# Patient Record
Sex: FEMALE | Race: BLACK OR AFRICAN AMERICAN | NOT HISPANIC OR LATINO | Employment: UNEMPLOYED | URBAN - METROPOLITAN AREA
[De-identification: names, ages, dates, MRNs, and addresses within clinical notes are randomized per-mention and may not be internally consistent; named-entity substitution may affect disease eponyms.]

---

## 2021-03-18 PROBLEM — I82.409 DVT (DEEP VENOUS THROMBOSIS): Status: ACTIVE | Noted: 2021-02-14

## 2021-03-18 PROBLEM — I10 ESSENTIAL HYPERTENSION: Status: ACTIVE | Noted: 2017-09-05

## 2021-03-18 PROBLEM — D64.9 ANEMIA: Status: ACTIVE | Noted: 2021-02-19

## 2024-10-21 DIAGNOSIS — N28.9 RENAL INSUFFICIENCY: Primary | ICD-10-CM

## 2024-12-13 ENCOUNTER — LAB VISIT (OUTPATIENT)
Dept: LAB | Facility: HOSPITAL | Age: 59
End: 2024-12-13
Attending: INTERNAL MEDICINE
Payer: COMMERCIAL

## 2024-12-13 DIAGNOSIS — N28.9 RENAL INSUFFICIENCY: ICD-10-CM

## 2024-12-13 LAB
ANION GAP SERPL CALC-SCNC: 11 MMOL/L (ref 8–16)
BASOPHILS # BLD AUTO: 0.06 K/UL (ref 0–0.2)
BASOPHILS NFR BLD: 0.8 % (ref 0–1.9)
BUN SERPL-MCNC: 16 MG/DL (ref 8–23)
CALCIUM SERPL-MCNC: 9.5 MG/DL (ref 8.7–10.5)
CHLORIDE SERPL-SCNC: 105 MMOL/L (ref 95–110)
CO2 SERPL-SCNC: 25 MMOL/L (ref 23–29)
CREAT SERPL-MCNC: 1.2 MG/DL (ref 0.5–1.4)
DIFFERENTIAL METHOD BLD: ABNORMAL
EOSINOPHIL # BLD AUTO: 0.1 K/UL (ref 0–0.5)
EOSINOPHIL NFR BLD: 0.9 % (ref 0–8)
ERYTHROCYTE [DISTWIDTH] IN BLOOD BY AUTOMATED COUNT: 16.3 % (ref 11.5–14.5)
EST. GFR  (NO RACE VARIABLE): 51.5 ML/MIN/1.73 M^2
GLUCOSE SERPL-MCNC: 90 MG/DL (ref 70–110)
HCT VFR BLD AUTO: 40.3 % (ref 37–48.5)
HGB BLD-MCNC: 11.8 G/DL (ref 12–16)
IMM GRANULOCYTES # BLD AUTO: 0.04 K/UL (ref 0–0.04)
IMM GRANULOCYTES NFR BLD AUTO: 0.5 % (ref 0–0.5)
LYMPHOCYTES # BLD AUTO: 2.6 K/UL (ref 1–4.8)
LYMPHOCYTES NFR BLD: 34 % (ref 18–48)
MCH RBC QN AUTO: 23.4 PG (ref 27–31)
MCHC RBC AUTO-ENTMCNC: 29.3 G/DL (ref 32–36)
MCV RBC AUTO: 80 FL (ref 82–98)
MONOCYTES # BLD AUTO: 0.6 K/UL (ref 0.3–1)
MONOCYTES NFR BLD: 8.2 % (ref 4–15)
NEUTROPHILS # BLD AUTO: 4.3 K/UL (ref 1.8–7.7)
NEUTROPHILS NFR BLD: 55.6 % (ref 38–73)
NRBC BLD-RTO: 0 /100 WBC
PLATELET # BLD AUTO: 209 K/UL (ref 150–450)
PMV BLD AUTO: 13.1 FL (ref 9.2–12.9)
POTASSIUM SERPL-SCNC: 4.4 MMOL/L (ref 3.5–5.1)
RBC # BLD AUTO: 5.04 M/UL (ref 4–5.4)
SODIUM SERPL-SCNC: 141 MMOL/L (ref 136–145)
WBC # BLD AUTO: 7.71 K/UL (ref 3.9–12.7)

## 2024-12-13 PROCEDURE — 36415 COLL VENOUS BLD VENIPUNCTURE: CPT | Performed by: INTERNAL MEDICINE

## 2024-12-13 PROCEDURE — 80048 BASIC METABOLIC PNL TOTAL CA: CPT | Performed by: INTERNAL MEDICINE

## 2024-12-13 PROCEDURE — 85025 COMPLETE CBC W/AUTO DIFF WBC: CPT | Performed by: INTERNAL MEDICINE

## 2024-12-20 ENCOUNTER — OFFICE VISIT (OUTPATIENT)
Dept: NEPHROLOGY | Facility: CLINIC | Age: 59
End: 2024-12-20
Payer: COMMERCIAL

## 2024-12-20 VITALS
RESPIRATION RATE: 18 BRPM | DIASTOLIC BLOOD PRESSURE: 62 MMHG | SYSTOLIC BLOOD PRESSURE: 102 MMHG | BODY MASS INDEX: 44.23 KG/M2 | HEIGHT: 57 IN | HEART RATE: 74 BPM | WEIGHT: 205 LBS

## 2024-12-20 DIAGNOSIS — I10 PRIMARY HYPERTENSION: Primary | ICD-10-CM

## 2024-12-20 PROCEDURE — 99999 PR PBB SHADOW E&M-EST. PATIENT-LVL IV: CPT | Mod: PBBFAC,,, | Performed by: INTERNAL MEDICINE

## 2024-12-20 RX ORDER — EZETIMIBE 10 MG/1
10 TABLET ORAL
COMMUNITY
Start: 2024-10-21 | End: 2025-04-19

## 2024-12-20 RX ORDER — BUDESONIDE AND FORMOTEROL FUMARATE DIHYDRATE 80; 4.5 UG/1; UG/1
2 AEROSOL RESPIRATORY (INHALATION)
COMMUNITY
Start: 2024-04-23

## 2024-12-20 RX ORDER — RIMEGEPANT SULFATE 75 MG/75MG
75 TABLET, ORALLY DISINTEGRATING ORAL DAILY PRN
COMMUNITY

## 2024-12-20 RX ORDER — ATORVASTATIN CALCIUM 40 MG/1
40 TABLET, FILM COATED ORAL EVERY MORNING
COMMUNITY

## 2024-12-20 RX ORDER — AMLODIPINE BESYLATE 5 MG/1
5 TABLET ORAL DAILY
Qty: 90 TABLET | Refills: 3 | Status: SHIPPED | OUTPATIENT
Start: 2024-12-20

## 2024-12-20 RX ORDER — HYDROCODONE BITARTRATE AND ACETAMINOPHEN 5; 325 MG/1; MG/1
1 TABLET ORAL
COMMUNITY
Start: 2024-10-23

## 2024-12-20 RX ORDER — NITROGLYCERIN 0.4 MG/1
TABLET SUBLINGUAL
COMMUNITY

## 2024-12-20 RX ORDER — ALBUTEROL SULFATE 90 UG/1
2 INHALANT RESPIRATORY (INHALATION) EVERY 4 HOURS PRN
COMMUNITY
Start: 2024-04-23

## 2024-12-20 RX ORDER — LINACLOTIDE 72 UG/1
72 CAPSULE, GELATIN COATED ORAL EVERY MORNING
COMMUNITY

## 2024-12-20 RX ORDER — ARIPIPRAZOLE 5 MG/1
5 TABLET ORAL
COMMUNITY

## 2024-12-20 NOTE — PROGRESS NOTES
"Samira Madrid is a 59 y.o. female for whom nephrology consult has been requested to evaluate and give opinion.   Renal clinic consult note:  Date of consult: 12/20/24  Reason for consult: renal concerns, reduced eGFR  Referring provider: Alee Vivar NP    HPI: Thank you for referring the pt to us. H/o and chart were reviewed. Pt was seen and examined. Pt is a 60 y/o female with h/o of HTN, past blood clots (DVT and stroke), and obesity who was referred to renal clinic for reduced eGFR. S Cr is within normal range. Pt has no c/o's today, no new issues, no dehydration, no GI loss, no h/o of taking NSAIds.    PAST MEDICAL HISTORY:  She  has a past medical history of Anemia, Anxiety and depression, CVA (cerebral vascular accident) (2015), DVT (deep venous thrombosis), Fibromyalgia, GERD (gastroesophageal reflux disease), HTN (hypertension), Hypothyroid, Left hemiparesis, and Migraine.    PAST SURGICAL HISTORY:  She  has a past surgical history that includes Transforaminal lumbar interbody fusion (TLIF) using computer-assisted navigation (02/03/2021); Knee surgery (Left); and Lipoma resection.    SOCIAL HISTORY:  She  reports that she has quit smoking. She does not have any smokeless tobacco history on file. She reports current alcohol use.    FAMILY MEDICAL HISTORY:  Her family history is not on file.    Review of patient's allergies indicates:   Allergen Reactions    Amitriptyline Itching    Aspirin Other (See Comments)     Other reaction(s): Other (See Comments)  Itching and nervous  "jittery"      Butalbital-aspirin-caffeine Other (See Comments)     Other reaction(s): Other (See Comments)  Patient stated she starts slurring and it makes her nervous      Codeine Itching and Other (See Comments)     nervous  "jittery"      Methocarbamol Itching    Menthol Anxiety           Prior to Admission medications    Medication Sig Start Date End Date Taking? Authorizing Provider   albuterol (PROVENTIL/VENTOLIN HFA) 90 " mcg/actuation inhaler Inhale 2 puffs into the lungs every 4 (four) hours as needed. 4/23/24  Yes Provider, Historical   ARIPiprazole (ABILIFY) 5 MG Tab Take 5 mg by mouth.   Yes Provider, Historical   atorvastatin (LIPITOR) 40 MG tablet Take 40 mg by mouth every morning.   Yes Provider, Historical   budesonide-formoterol 80-4.5 mcg (SYMBICORT) 80-4.5 mcg/actuation HFAA Inhale 2 puffs into the lungs. 4/23/24  Yes Provider, Historical   clopidogreL (PLAVIX) 75 mg tablet Take 75 mg by mouth once daily. 12/31/20  Yes Provider, Historical   dicyclomine (BENTYL) 20 mg tablet Take 20 mg by mouth.   Yes Provider, Historical   divalproex ER (DEPAKOTE) 500 MG Tb24 Take 500 mg by mouth once daily. 2/17/21  Yes Provider, Historical   esomeprazole (NEXIUM) 40 MG capsule Take 20 mg by mouth.  9/28/20  Yes Provider, Historical   ezetimibe (ZETIA) 10 mg tablet Take 10 mg by mouth. 10/21/24 4/19/25 Yes Provider, Historical   HYDROcodone-acetaminophen (NORCO) 5-325 mg per tablet Take 1 tablet by mouth. 10/23/24  Yes Provider, Historical   hydrOXYzine HCL (ATARAX) 25 MG tablet Take 25 mg by mouth 3 (three) times daily.   Yes Provider, Historical   lisinopriL (PRINIVIL,ZESTRIL) 20 MG tablet Take 20 mg by mouth once daily. 12/24/20  Yes Provider, Historical   multivitamin with minerals tablet Take 2 tablets by mouth.   Yes Provider, Historical   nitroGLYCERIN (NITROSTAT) 0.4 MG SL tablet Place under the tongue.   Yes Provider, Historical   NURTEC 75 mg odt Take 75 mg by mouth daily as needed.   Yes Provider, Historical   ondansetron (ZOFRAN-ODT) 4 MG TbDL DISSOLVE 1 TABLET IN MOUTH EVERY 6 HOURS AS NEEDED FOR NAUSEA FOR 7 DAYS 3/5/21  Yes Provider, Historical   oxyCODONE-acetaminophen (PERCOCET) 5-325 mg per tablet Take 1 tablet by mouth every 6 (six) hours as needed. 3/5/21  Yes Provider, Historical   simvastatin (ZOCOR) 20 MG tablet  1/23/21  Yes Provider, Historical   amLODIPine (NORVASC) 10 MG tablet Take 5 mg by mouth once daily.  "1/4/21 12/20/24 Yes Provider, Historical   amLODIPine (NORVASC) 5 MG tablet Take 1 tablet (5 mg total) by mouth once daily. 12/20/24   Lindsey Bermeo MD   doxycycline (VIBRAMYCIN) 100 MG Cap Take 100 mg by mouth 2 (two) times daily.  Patient not taking: Reported on 12/20/2024 3/12/21   Provider, Historical   LINZESS 72 mcg Cap capsule Take 72 mcg by mouth every morning.    Provider, Historical        REVIEW OF SYSTEMS:  Patient has no fever, fatigue, visual changes, chest pain, edema, cough, dyspnea, nausea, vomiting, constipation, diarrhea, arthralgias, pruritis, dizziness, weakness, depression, confusion.    PHYSICAL EXAM:   height is 4' 9" (1.448 m) and weight is 93 kg (205 lb 0.4 oz). Her blood pressure is 102/62 and her pulse is 74. Her respiration is 18.   Gen: WDWN female in no apparent distress  Psych: Normal mood and affect  Skin: No rashes or ulcers  Neck: No JVD  Chest: Clear with no rales, rhonchi, wheezing with normal effort  CV: Regular with no murmurs, gallops or rubs  Abd: Soft, nontender, no distension, positive bowel sounds  Ext: 1+ non-pitting edema    Labs reviewed  BMP  Lab Results   Component Value Date     12/13/2024    K 4.4 12/13/2024     12/13/2024    CO2 25 12/13/2024    BUN 16 12/13/2024    CREATININE 1.2 12/13/2024    CALCIUM 9.5 12/13/2024    ANIONGAP 11 12/13/2024    EGFRNORACEVR 51.5 (A) 12/13/2024     Lab Results   Component Value Date    WBC 7.71 12/13/2024    HGB 11.8 (L) 12/13/2024    HCT 40.3 12/13/2024    MCV 80 (L) 12/13/2024     12/13/2024     U/a: no protein, no blood    IMPRESSION AND RECOMMENDATIONS:  60 y/o female with h/o of HTN and obesity was referred for evaluation of laboratory reported reduced eGFR:    Renal: s Cr is in normal range  Stable renal function  The lab reported reduced eGFR can be inaccurate in obese individuals and should not be over-interpreted  The important issue is that s Cr is in normal range and has been stable  K normal  Na " normal  Ca normal  Acid base no issues    2. HTN: BP controlled and low  Meds reviewed  Will lower amlodipine from 10 to 5 mg po qd    3. Leg edema: non-pitting.  Likely side effect of amlodipine  Lowering the dose likely will improved the edema  No proteinuria  Pt was reassured    Plans and recommendations:  As discussed above  Total time spent 60 minutes including time needed to review the records, the   patient evaluation, documentation, face-to-face discussion with the patient,   more than 50% of the time was spent on coordination of care and counseling.    Level V visit.  RTC 6-9 months  F/u with PCP    Lindsey Bermeo MD